# Patient Record
Sex: MALE | Race: WHITE | Employment: OTHER | URBAN - METROPOLITAN AREA
[De-identification: names, ages, dates, MRNs, and addresses within clinical notes are randomized per-mention and may not be internally consistent; named-entity substitution may affect disease eponyms.]

---

## 2018-10-15 PROBLEM — H01.00A BLEPHARITIS OF UPPER AND LOWER EYELIDS OF BOTH EYES, UNSPECIFIED TYPE: Status: ACTIVE | Noted: 2018-10-15

## 2018-10-15 PROBLEM — H25.13 NUCLEAR SCLEROTIC CATARACT OF BOTH EYES: Status: ACTIVE | Noted: 2018-10-15

## 2018-10-15 PROBLEM — H43.812 POSTERIOR VITREOUS DETACHMENT OF LEFT EYE: Status: ACTIVE | Noted: 2018-10-15

## 2018-10-15 PROBLEM — H01.00B BLEPHARITIS OF UPPER AND LOWER EYELIDS OF BOTH EYES, UNSPECIFIED TYPE: Status: ACTIVE | Noted: 2018-10-15

## 2018-10-15 PROBLEM — H52.7 REFRACTIVE ERROR: Status: ACTIVE | Noted: 2018-10-15

## 2019-10-17 PROBLEM — D49.2 GROWTH OF EYELID: Status: ACTIVE | Noted: 2019-10-17

## 2020-02-24 PROCEDURE — 88305 TISSUE EXAM BY PATHOLOGIST: CPT | Performed by: OPHTHALMOLOGY

## 2020-11-21 ENCOUNTER — HOSPITAL ENCOUNTER (OUTPATIENT)
Age: 75
Discharge: HOME | End: 2020-11-21
Attending: EMERGENCY MEDICINE
Payer: MEDICARE

## 2020-11-21 VITALS
OXYGEN SATURATION: 98 % | RESPIRATION RATE: 16 BRPM | DIASTOLIC BLOOD PRESSURE: 84 MMHG | SYSTOLIC BLOOD PRESSURE: 141 MMHG | TEMPERATURE: 99 F | HEART RATE: 60 BPM

## 2020-11-21 DIAGNOSIS — Z20.822 ENCOUNTER FOR LABORATORY TESTING FOR COVID-19 VIRUS: Primary | ICD-10-CM

## 2020-11-21 PROCEDURE — 99202 OFFICE O/P NEW SF 15 MIN: CPT | Performed by: EMERGENCY MEDICINE

## 2020-11-21 NOTE — ED PROVIDER NOTES
Patient Seen in: [facility]    History   Patient presents with:  Testing    Stated Complaint: wife positive    HPI    Patient complains of covid exposure  asymptomatic.     Alleviating factors:   Exacerbating factors:     Past Medical History: no lesions  NECK: supple, no meningeal signs  LUNGS: no resp distress, cta bilateral  CARDIO: RRR without murmur  GI: abdomen is soft and non tender, no masses, nl bowel sounds   EXTREMITIES: from, 5/5 strength in all 4 ext, no edema  NEURO: alert and oien

## 2021-01-14 ENCOUNTER — LAB SERVICES (OUTPATIENT)
Dept: LAB | Age: 76
End: 2021-01-14

## 2021-01-14 ENCOUNTER — LAB REQUISITION (OUTPATIENT)
Dept: LAB | Age: 76
End: 2021-01-14

## 2021-01-14 DIAGNOSIS — N40.0 BENIGN PROSTATIC HYPERPLASIA WITHOUT LOWER URINARY TRACT SYMPTOMS: ICD-10-CM

## 2021-01-14 DIAGNOSIS — I10 ESSENTIAL (PRIMARY) HYPERTENSION: ICD-10-CM

## 2021-01-14 DIAGNOSIS — E78.00 PURE HYPERCHOLESTEROLEMIA, UNSPECIFIED: ICD-10-CM

## 2021-01-14 LAB
ALBUMIN SERPL-MCNC: 4 G/DL (ref 3.6–5.1)
ALBUMIN/GLOB SERPL: 1.1 {RATIO} (ref 1–2.4)
ALP SERPL-CCNC: 67 UNITS/L (ref 45–117)
ALT SERPL-CCNC: 36 UNITS/L
ANION GAP SERPL CALC-SCNC: 13 MMOL/L (ref 10–20)
APPEARANCE UR: CLEAR
AST SERPL-CCNC: 27 UNITS/L
BASOPHILS # BLD: 0.1 K/MCL (ref 0–0.3)
BASOPHILS NFR BLD: 1 %
BILIRUB SERPL-MCNC: 0.8 MG/DL (ref 0.2–1)
BILIRUB UR QL STRIP: NEGATIVE
BUN SERPL-MCNC: 20 MG/DL (ref 6–20)
BUN/CREAT SERPL: 24 (ref 7–25)
CALCIUM SERPL-MCNC: 9.6 MG/DL (ref 8.4–10.2)
CHLORIDE SERPL-SCNC: 105 MMOL/L (ref 98–107)
CHOLEST SERPL-MCNC: 130 MG/DL
CHOLEST/HDLC SERPL: 3.9 {RATIO}
CO2 SERPL-SCNC: 28 MMOL/L (ref 21–32)
COLOR UR: YELLOW
CREAT SERPL-MCNC: 0.85 MG/DL (ref 0.67–1.17)
DEPRECATED RDW RBC: 44.9 FL (ref 39–50)
EOSINOPHIL # BLD: 0.2 K/MCL (ref 0–0.5)
EOSINOPHIL NFR BLD: 2 %
ERYTHROCYTE [DISTWIDTH] IN BLOOD: 13.9 % (ref 11–15)
FASTING DURATION TIME PATIENT: 12 HOURS
FASTING DURATION TIME PATIENT: 12 HOURS
GFR SERPLBLD BASED ON 1.73 SQ M-ARVRAT: 86 ML/MIN/1.73M2
GLOBULIN SER-MCNC: 3.6 G/DL (ref 2–4)
GLUCOSE SERPL-MCNC: 95 MG/DL (ref 65–99)
GLUCOSE UR STRIP-MCNC: NEGATIVE MG/DL
HCT VFR BLD CALC: 48.5 % (ref 39–51)
HDLC SERPL-MCNC: 33 MG/DL
HGB BLD-MCNC: 15.6 G/DL (ref 13–17)
HGB UR QL STRIP: NEGATIVE
IMM GRANULOCYTES # BLD AUTO: 0 K/MCL (ref 0–0.2)
IMM GRANULOCYTES # BLD: 0 %
KETONES UR STRIP-MCNC: NEGATIVE MG/DL
LDLC SERPL CALC-MCNC: 61 MG/DL
LEUKOCYTE ESTERASE UR QL STRIP: NEGATIVE
LYMPHOCYTES # BLD: 3.2 K/MCL (ref 1–4)
LYMPHOCYTES NFR BLD: 31 %
MCH RBC QN AUTO: 28.3 PG (ref 26–34)
MCHC RBC AUTO-ENTMCNC: 32.2 G/DL (ref 32–36.5)
MCV RBC AUTO: 88 FL (ref 78–100)
MONOCYTES # BLD: 0.6 K/MCL (ref 0.3–0.9)
MONOCYTES NFR BLD: 6 %
NEUTROPHILS # BLD: 6.4 K/MCL (ref 1.8–7.7)
NEUTROPHILS NFR BLD: 60 %
NITRITE UR QL STRIP: NEGATIVE
NONHDLC SERPL-MCNC: 97 MG/DL
NRBC BLD MANUAL-RTO: 0 /100 WBC
PH UR STRIP: 7 [PH] (ref 5–7)
PLATELET # BLD AUTO: 206 K/MCL (ref 140–450)
POTASSIUM SERPL-SCNC: 4.6 MMOL/L (ref 3.4–5.1)
PROT SERPL-MCNC: 7.6 G/DL (ref 6.4–8.2)
PROT UR STRIP-MCNC: NEGATIVE MG/DL
PSA SERPL-MCNC: 2.49 NG/ML
RBC # BLD: 5.51 MIL/MCL (ref 4.5–5.9)
SODIUM SERPL-SCNC: 141 MMOL/L (ref 135–145)
SP GR UR STRIP: 1.01 (ref 1–1.03)
TRIGL SERPL-MCNC: 181 MG/DL
UROBILINOGEN UR STRIP-MCNC: 0.2 MG/DL
WBC # BLD: 10.5 K/MCL (ref 4.2–11)

## 2021-01-14 PROCEDURE — 80061 LIPID PANEL: CPT | Performed by: CLINICAL MEDICAL LABORATORY

## 2021-01-14 PROCEDURE — 80053 COMPREHEN METABOLIC PANEL: CPT | Performed by: CLINICAL MEDICAL LABORATORY

## 2021-01-14 PROCEDURE — 84153 ASSAY OF PSA TOTAL: CPT | Performed by: CLINICAL MEDICAL LABORATORY

## 2021-01-14 PROCEDURE — 81003 URINALYSIS AUTO W/O SCOPE: CPT | Performed by: CLINICAL MEDICAL LABORATORY

## 2021-01-14 PROCEDURE — 85025 COMPLETE CBC W/AUTO DIFF WBC: CPT | Performed by: CLINICAL MEDICAL LABORATORY

## 2022-01-20 ENCOUNTER — LAB SERVICES (OUTPATIENT)
Dept: LAB | Age: 77
End: 2022-01-20

## 2022-01-20 ENCOUNTER — LAB REQUISITION (OUTPATIENT)
Dept: LAB | Age: 77
End: 2022-01-20

## 2022-01-20 DIAGNOSIS — I10 ESSENTIAL (PRIMARY) HYPERTENSION: ICD-10-CM

## 2022-01-20 DIAGNOSIS — N40.0 BENIGN PROSTATIC HYPERPLASIA WITHOUT LOWER URINARY TRACT SYMPTOMS: ICD-10-CM

## 2022-01-20 DIAGNOSIS — E78.00 PURE HYPERCHOLESTEROLEMIA, UNSPECIFIED: ICD-10-CM

## 2022-01-20 LAB
ALBUMIN SERPL-MCNC: 4 G/DL (ref 3.6–5.1)
ALBUMIN/GLOB SERPL: 1 {RATIO} (ref 1–2.4)
ALP SERPL-CCNC: 75 UNITS/L (ref 45–117)
ALT SERPL-CCNC: 39 UNITS/L
ANION GAP SERPL CALC-SCNC: 12 MMOL/L (ref 10–20)
APPEARANCE UR: CLEAR
AST SERPL-CCNC: 28 UNITS/L
BILIRUB SERPL-MCNC: 1.2 MG/DL (ref 0.2–1)
BILIRUB UR QL STRIP: NEGATIVE
BUN SERPL-MCNC: 19 MG/DL (ref 6–20)
BUN/CREAT SERPL: 18 (ref 7–25)
CALCIUM SERPL-MCNC: 9.5 MG/DL (ref 8.4–10.2)
CHLORIDE SERPL-SCNC: 105 MMOL/L (ref 98–107)
CHOLEST SERPL-MCNC: 127 MG/DL
CHOLEST/HDLC SERPL: 3.5 {RATIO}
CO2 SERPL-SCNC: 26 MMOL/L (ref 21–32)
COLOR UR: NORMAL
CREAT SERPL-MCNC: 1.04 MG/DL (ref 0.67–1.17)
DEPRECATED RDW RBC: 42.2 FL (ref 39–50)
ERYTHROCYTE [DISTWIDTH] IN BLOOD: 13 % (ref 11–15)
FASTING DURATION TIME PATIENT: 12 HOURS (ref 0–999)
FASTING DURATION TIME PATIENT: 12 HOURS (ref 0–999)
GFR SERPLBLD BASED ON 1.73 SQ M-ARVRAT: 70 ML/MIN
GLOBULIN SER-MCNC: 3.9 G/DL (ref 2–4)
GLUCOSE SERPL-MCNC: 95 MG/DL (ref 70–99)
GLUCOSE UR STRIP-MCNC: NEGATIVE MG/DL
HCT VFR BLD CALC: 49.2 % (ref 39–51)
HDLC SERPL-MCNC: 36 MG/DL
HGB BLD-MCNC: 16.9 G/DL (ref 13–17)
HGB UR QL STRIP: NEGATIVE
KETONES UR STRIP-MCNC: NEGATIVE MG/DL
LDLC SERPL CALC-MCNC: 67 MG/DL
LEUKOCYTE ESTERASE UR QL STRIP: NEGATIVE
MCH RBC QN AUTO: 30.5 PG (ref 26–34)
MCHC RBC AUTO-ENTMCNC: 34.3 G/DL (ref 32–36.5)
MCV RBC AUTO: 88.8 FL (ref 78–100)
NITRITE UR QL STRIP: NEGATIVE
NONHDLC SERPL-MCNC: 91 MG/DL
NRBC BLD MANUAL-RTO: 0 /100 WBC
PH UR STRIP: 6 [PH] (ref 5–7)
PLATELET # BLD AUTO: 161 K/MCL (ref 140–450)
POTASSIUM SERPL-SCNC: 4.5 MMOL/L (ref 3.4–5.1)
PROT SERPL-MCNC: 7.9 G/DL (ref 6.4–8.2)
PROT UR STRIP-MCNC: NEGATIVE MG/DL
PSA SERPL-MCNC: 2.77 NG/ML
RBC # BLD: 5.54 MIL/MCL (ref 4.5–5.9)
SODIUM SERPL-SCNC: 138 MMOL/L (ref 135–145)
SP GR UR STRIP: 1.01 (ref 1–1.03)
TRIGL SERPL-MCNC: 121 MG/DL
UROBILINOGEN UR STRIP-MCNC: 0.2 MG/DL
WBC # BLD: 9 K/MCL (ref 4.2–11)

## 2022-01-20 PROCEDURE — 80061 LIPID PANEL: CPT | Performed by: CLINICAL MEDICAL LABORATORY

## 2022-01-20 PROCEDURE — 84153 ASSAY OF PSA TOTAL: CPT | Performed by: CLINICAL MEDICAL LABORATORY

## 2022-01-20 PROCEDURE — 80053 COMPREHEN METABOLIC PANEL: CPT | Performed by: CLINICAL MEDICAL LABORATORY

## 2022-01-20 PROCEDURE — 36415 COLL VENOUS BLD VENIPUNCTURE: CPT | Performed by: CLINICAL MEDICAL LABORATORY

## 2022-01-20 PROCEDURE — 85027 COMPLETE CBC AUTOMATED: CPT | Performed by: CLINICAL MEDICAL LABORATORY

## 2022-01-20 PROCEDURE — 81003 URINALYSIS AUTO W/O SCOPE: CPT | Performed by: CLINICAL MEDICAL LABORATORY

## 2022-02-17 ENCOUNTER — HOSPITAL ENCOUNTER (OUTPATIENT)
Age: 77
Discharge: HOME | End: 2022-02-17
Payer: MEDICARE

## 2022-02-17 VITALS
BODY MASS INDEX: 28.98 KG/M2 | WEIGHT: 207 LBS | OXYGEN SATURATION: 99 % | TEMPERATURE: 100 F | SYSTOLIC BLOOD PRESSURE: 141 MMHG | DIASTOLIC BLOOD PRESSURE: 73 MMHG | HEART RATE: 71 BPM | RESPIRATION RATE: 16 BRPM | HEIGHT: 71 IN

## 2022-02-17 DIAGNOSIS — B02.9 HERPES ZOSTER WITHOUT COMPLICATION: Primary | ICD-10-CM

## 2022-02-17 PROCEDURE — 99203 OFFICE O/P NEW LOW 30 MIN: CPT | Performed by: NURSE PRACTITIONER

## 2022-02-17 RX ORDER — VALACYCLOVIR HYDROCHLORIDE 1 G/1
1000 TABLET, FILM COATED ORAL 3 TIMES DAILY
Qty: 21 TABLET | Refills: 0 | Status: SHIPPED | OUTPATIENT
Start: 2022-02-17 | End: 2022-02-24

## 2022-02-17 NOTE — ED INITIAL ASSESSMENT (HPI)
Pt presents to the IC with c/o a red, painful rash to the right side of his chest and right middle back. Pain increases with palpation (ie when his shirt rubs against the area per pt).

## 2022-12-15 ENCOUNTER — LAB SERVICES (OUTPATIENT)
Dept: LAB | Age: 77
End: 2022-12-15

## 2022-12-15 ENCOUNTER — LAB REQUISITION (OUTPATIENT)
Dept: LAB | Age: 77
End: 2022-12-15

## 2022-12-15 DIAGNOSIS — R35.89 OTHER POLYURIA: ICD-10-CM

## 2022-12-15 DIAGNOSIS — N40.0 BENIGN PROSTATIC HYPERPLASIA WITHOUT LOWER URINARY TRACT SYMPTOMS: ICD-10-CM

## 2022-12-15 LAB
ALBUMIN SERPL-MCNC: 4.1 G/DL (ref 3.6–5.1)
ALBUMIN/GLOB SERPL: 1.1 {RATIO} (ref 1–2.4)
ALP SERPL-CCNC: 76 UNITS/L (ref 45–117)
ALT SERPL-CCNC: 32 UNITS/L
ANION GAP SERPL CALC-SCNC: 11 MMOL/L (ref 7–19)
APPEARANCE UR: CLEAR
AST SERPL-CCNC: 25 UNITS/L
BACTERIA #/AREA URNS HPF: ABNORMAL /HPF
BILIRUB SERPL-MCNC: 1.1 MG/DL (ref 0.2–1)
BILIRUB UR QL STRIP: NEGATIVE
BUN SERPL-MCNC: 20 MG/DL (ref 6–20)
BUN/CREAT SERPL: 20 (ref 7–25)
CALCIUM SERPL-MCNC: 9.5 MG/DL (ref 8.4–10.2)
CHLORIDE SERPL-SCNC: 107 MMOL/L (ref 97–110)
CO2 SERPL-SCNC: 27 MMOL/L (ref 21–32)
COLOR UR: ABNORMAL
CREAT SERPL-MCNC: 1.01 MG/DL (ref 0.67–1.17)
DEPRECATED RDW RBC: 42.1 FL (ref 39–50)
ERYTHROCYTE [DISTWIDTH] IN BLOOD: 13.3 % (ref 11–15)
FASTING DURATION TIME PATIENT: 0 HOURS (ref 0–999)
GFR SERPLBLD BASED ON 1.73 SQ M-ARVRAT: 77 ML/MIN
GLOBULIN SER-MCNC: 3.8 G/DL (ref 2–4)
GLUCOSE SERPL-MCNC: 104 MG/DL (ref 70–99)
GLUCOSE UR STRIP-MCNC: NEGATIVE MG/DL
HCT VFR BLD CALC: 46.6 % (ref 39–51)
HGB BLD-MCNC: 16.1 G/DL (ref 13–17)
HGB UR QL STRIP: NEGATIVE
HYALINE CASTS #/AREA URNS LPF: ABNORMAL /LPF
KETONES UR STRIP-MCNC: NEGATIVE MG/DL
LEUKOCYTE ESTERASE UR QL STRIP: NEGATIVE
MCH RBC QN AUTO: 29.8 PG (ref 26–34)
MCHC RBC AUTO-ENTMCNC: 34.5 G/DL (ref 32–36.5)
MCV RBC AUTO: 86.3 FL (ref 78–100)
MUCOUS THREADS URNS QL MICRO: PRESENT
NITRITE UR QL STRIP: NEGATIVE
NRBC BLD MANUAL-RTO: 0 /100 WBC
PH UR STRIP: 6 [PH] (ref 5–7)
PLATELET # BLD AUTO: 201 K/MCL (ref 140–450)
POTASSIUM SERPL-SCNC: 4.5 MMOL/L (ref 3.4–5.1)
PROT SERPL-MCNC: 7.9 G/DL (ref 6.4–8.2)
PROT UR STRIP-MCNC: ABNORMAL MG/DL
PSA SERPL-MCNC: 3.4 NG/ML
RBC # BLD: 5.4 MIL/MCL (ref 4.5–5.9)
RBC #/AREA URNS HPF: ABNORMAL /HPF
SODIUM SERPL-SCNC: 140 MMOL/L (ref 135–145)
SP GR UR STRIP: 1.02 (ref 1–1.03)
SQUAMOUS #/AREA URNS HPF: ABNORMAL /HPF
UROBILINOGEN UR STRIP-MCNC: 0.2 MG/DL
WBC # BLD: 10.5 K/MCL (ref 4.2–11)
WBC #/AREA URNS HPF: ABNORMAL /HPF

## 2022-12-15 PROCEDURE — 81001 URINALYSIS AUTO W/SCOPE: CPT | Performed by: CLINICAL MEDICAL LABORATORY

## 2022-12-15 PROCEDURE — 80053 COMPREHEN METABOLIC PANEL: CPT | Performed by: CLINICAL MEDICAL LABORATORY

## 2022-12-15 PROCEDURE — 36415 COLL VENOUS BLD VENIPUNCTURE: CPT | Performed by: CLINICAL MEDICAL LABORATORY

## 2022-12-15 PROCEDURE — 84153 ASSAY OF PSA TOTAL: CPT | Performed by: CLINICAL MEDICAL LABORATORY

## 2022-12-15 PROCEDURE — 85027 COMPLETE CBC AUTOMATED: CPT | Performed by: CLINICAL MEDICAL LABORATORY

## 2023-01-26 ENCOUNTER — LAB REQUISITION (OUTPATIENT)
Dept: LAB | Age: 78
End: 2023-01-26

## 2023-01-26 ENCOUNTER — LAB SERVICES (OUTPATIENT)
Dept: LAB | Age: 78
End: 2023-01-26

## 2023-01-26 DIAGNOSIS — K22.70 BARRETT'S ESOPHAGUS WITHOUT DYSPLASIA: ICD-10-CM

## 2023-01-26 DIAGNOSIS — E78.00 PURE HYPERCHOLESTEROLEMIA, UNSPECIFIED: ICD-10-CM

## 2023-01-26 DIAGNOSIS — I10 ESSENTIAL (PRIMARY) HYPERTENSION: ICD-10-CM

## 2023-01-26 DIAGNOSIS — N40.0 BENIGN PROSTATIC HYPERPLASIA WITHOUT LOWER URINARY TRACT SYMPTOMS: ICD-10-CM

## 2023-01-26 LAB
ALBUMIN SERPL-MCNC: 4.1 G/DL (ref 3.6–5.1)
ALBUMIN/GLOB SERPL: 1.1 {RATIO} (ref 1–2.4)
ALP SERPL-CCNC: 75 UNITS/L (ref 45–117)
ALT SERPL-CCNC: 34 UNITS/L
ANION GAP SERPL CALC-SCNC: 11 MMOL/L (ref 7–19)
APPEARANCE UR: CLEAR
AST SERPL-CCNC: 24 UNITS/L
BILIRUB SERPL-MCNC: 1.2 MG/DL (ref 0.2–1)
BILIRUB UR QL STRIP: NEGATIVE
BUN SERPL-MCNC: 20 MG/DL (ref 6–20)
BUN/CREAT SERPL: 19 (ref 7–25)
CALCIUM SERPL-MCNC: 9.7 MG/DL (ref 8.4–10.2)
CHLORIDE SERPL-SCNC: 106 MMOL/L (ref 97–110)
CHOLEST SERPL-MCNC: 127 MG/DL
CHOLEST/HDLC SERPL: 4 {RATIO}
CO2 SERPL-SCNC: 26 MMOL/L (ref 21–32)
COLOR UR: NORMAL
CREAT SERPL-MCNC: 1.05 MG/DL (ref 0.67–1.17)
DEPRECATED RDW RBC: 42.6 FL (ref 39–50)
ERYTHROCYTE [DISTWIDTH] IN BLOOD: 13.1 % (ref 11–15)
FASTING DURATION TIME PATIENT: 12 HOURS (ref 0–999)
FASTING DURATION TIME PATIENT: 12 HOURS (ref 0–999)
GFR SERPLBLD BASED ON 1.73 SQ M-ARVRAT: 74 ML/MIN
GLOBULIN SER-MCNC: 3.7 G/DL (ref 2–4)
GLUCOSE SERPL-MCNC: 97 MG/DL (ref 70–99)
GLUCOSE UR STRIP-MCNC: NEGATIVE MG/DL
HCT VFR BLD CALC: 49.1 % (ref 39–51)
HDLC SERPL-MCNC: 32 MG/DL
HGB BLD-MCNC: 16.2 G/DL (ref 13–17)
HGB UR QL STRIP: NEGATIVE
KETONES UR STRIP-MCNC: NEGATIVE MG/DL
LDLC SERPL CALC-MCNC: 63 MG/DL
LEUKOCYTE ESTERASE UR QL STRIP: NEGATIVE
MCH RBC QN AUTO: 29.4 PG (ref 26–34)
MCHC RBC AUTO-ENTMCNC: 33 G/DL (ref 32–36.5)
MCV RBC AUTO: 89.1 FL (ref 78–100)
NITRITE UR QL STRIP: NEGATIVE
NONHDLC SERPL-MCNC: 95 MG/DL
NRBC BLD MANUAL-RTO: 0 /100 WBC
PH UR STRIP: 6 [PH] (ref 5–7)
PLATELET # BLD AUTO: 149 K/MCL (ref 140–450)
POTASSIUM SERPL-SCNC: 4.4 MMOL/L (ref 3.4–5.1)
PROT SERPL-MCNC: 7.8 G/DL (ref 6.4–8.2)
PROT UR STRIP-MCNC: NEGATIVE MG/DL
PSA SERPL-MCNC: 2.98 NG/ML
RBC # BLD: 5.51 MIL/MCL (ref 4.5–5.9)
SODIUM SERPL-SCNC: 139 MMOL/L (ref 135–145)
SP GR UR STRIP: 1.01 (ref 1–1.03)
TRIGL SERPL-MCNC: 161 MG/DL
UROBILINOGEN UR STRIP-MCNC: 0.2 MG/DL
WBC # BLD: 10.1 K/MCL (ref 4.2–11)

## 2023-01-26 PROCEDURE — 85027 COMPLETE CBC AUTOMATED: CPT | Performed by: CLINICAL MEDICAL LABORATORY

## 2023-01-26 PROCEDURE — 36415 COLL VENOUS BLD VENIPUNCTURE: CPT | Performed by: CLINICAL MEDICAL LABORATORY

## 2023-01-26 PROCEDURE — 81003 URINALYSIS AUTO W/O SCOPE: CPT | Performed by: CLINICAL MEDICAL LABORATORY

## 2023-01-26 PROCEDURE — 80061 LIPID PANEL: CPT | Performed by: CLINICAL MEDICAL LABORATORY

## 2023-01-26 PROCEDURE — 84153 ASSAY OF PSA TOTAL: CPT | Performed by: CLINICAL MEDICAL LABORATORY

## 2023-01-26 PROCEDURE — 80053 COMPREHEN METABOLIC PANEL: CPT | Performed by: CLINICAL MEDICAL LABORATORY

## 2023-11-11 ENCOUNTER — HOSPITAL ENCOUNTER (OUTPATIENT)
Age: 78
Discharge: HOME | End: 2023-11-11
Payer: MEDICARE

## 2023-11-11 VITALS
HEART RATE: 66 BPM | DIASTOLIC BLOOD PRESSURE: 74 MMHG | SYSTOLIC BLOOD PRESSURE: 120 MMHG | RESPIRATION RATE: 18 BRPM | OXYGEN SATURATION: 98 % | TEMPERATURE: 98 F

## 2023-11-11 DIAGNOSIS — K52.9 GASTROENTERITIS: Primary | ICD-10-CM

## 2023-11-11 LAB
#MXD IC: 1.1 X10ˆ3/UL (ref 0.1–1)
BILIRUB UR QL STRIP: NEGATIVE
BUN BLD-MCNC: 26 MG/DL (ref 7–18)
CHLORIDE BLD-SCNC: 100 MMOL/L (ref 98–112)
CLARITY UR: CLEAR
CO2 BLD-SCNC: 22 MMOL/L (ref 21–32)
CREAT BLD-MCNC: 1.1 MG/DL
EGFRCR SERPLBLD CKD-EPI 2021: 69 ML/MIN/1.73M2 (ref 60–?)
GLUCOSE BLD-MCNC: 114 MG/DL (ref 70–99)
GLUCOSE UR STRIP-MCNC: NEGATIVE MG/DL
HCT VFR BLD AUTO: 45.8 %
HCT VFR BLD CALC: 51 %
HGB BLD-MCNC: 14.9 G/DL
HGB UR QL STRIP: NEGATIVE
ISTAT IONIZED CALCIUM FOR CHEM 8: 1.23 MMOL/L (ref 1.12–1.32)
KETONES UR STRIP-MCNC: NEGATIVE MG/DL
LEUKOCYTE ESTERASE UR QL STRIP: NEGATIVE
LYMPHOCYTES # BLD AUTO: 1.4 X10ˆ3/UL (ref 1–4)
LYMPHOCYTES NFR BLD AUTO: 14.8 %
MCH RBC QN AUTO: 28.9 PG (ref 26–34)
MCHC RBC AUTO-ENTMCNC: 32.5 G/DL (ref 31–37)
MCV RBC AUTO: 88.8 FL (ref 80–100)
MIXED CELL %: 11.3 %
NEUTROPHILS # BLD AUTO: 7.2 X10ˆ3/UL (ref 1.5–7.7)
NEUTROPHILS NFR BLD AUTO: 73.9 %
NITRITE UR QL STRIP: NEGATIVE
PH UR STRIP: 6 [PH]
PLATELET # BLD AUTO: 167 X10ˆ3/UL (ref 150–450)
POTASSIUM BLD-SCNC: 3.8 MMOL/L (ref 3.6–5.1)
PROT UR STRIP-MCNC: 100 MG/DL
RBC # BLD AUTO: 5.16 X10ˆ6/UL
SODIUM BLD-SCNC: 136 MMOL/L (ref 136–145)
SP GR UR STRIP: >=1.03
UROBILINOGEN UR STRIP-ACNC: <2 MG/DL
WBC # BLD AUTO: 9.7 X10ˆ3/UL (ref 4–11)

## 2023-11-11 RX ORDER — ONDANSETRON 4 MG/1
4 TABLET, ORALLY DISINTEGRATING ORAL ONCE
Status: COMPLETED | OUTPATIENT
Start: 2023-11-11 | End: 2023-11-11

## 2023-11-11 RX ORDER — ONDANSETRON 4 MG/1
4 TABLET, ORALLY DISINTEGRATING ORAL EVERY 8 HOURS PRN
Qty: 10 TABLET | Refills: 0 | Status: SHIPPED | OUTPATIENT
Start: 2023-11-11 | End: 2023-11-18

## 2023-11-11 NOTE — DISCHARGE INSTRUCTIONS
Zofran 1 tablet every 8 hours as needed for nausea  Push fluids  VIKAS diet for now then slowly advance.   (Bananas, rice, applesauce, toast, tea)  If you develop abdominal pain, vomiting that will not stop, or worsening symptoms, please go to the emergency room  Follow up with your doctor in 2 days if no improvement

## 2023-11-11 NOTE — ED INITIAL ASSESSMENT (HPI)
Pt c/o diarrhea and emesis that began yesterday. States has been taking pepto bismol with no relief. C/o diffuse abdominal discomfort. Denies urinary symptoms. Denies blood in stool. Denies fever, chills. Denies sore throat, cough.

## 2024-02-22 ENCOUNTER — LAB SERVICES (OUTPATIENT)
Dept: LAB | Age: 79
End: 2024-02-22

## 2024-02-22 ENCOUNTER — LAB REQUISITION (OUTPATIENT)
Dept: LAB | Age: 79
End: 2024-02-22

## 2024-02-22 DIAGNOSIS — I10 ESSENTIAL (PRIMARY) HYPERTENSION: ICD-10-CM

## 2024-02-22 DIAGNOSIS — K22.70 BARRETT'S ESOPHAGUS WITHOUT DYSPLASIA: ICD-10-CM

## 2024-02-22 DIAGNOSIS — N40.0 BENIGN PROSTATIC HYPERPLASIA WITHOUT LOWER URINARY TRACT SYMPTOMS: ICD-10-CM

## 2024-02-22 DIAGNOSIS — E78.00 PURE HYPERCHOLESTEROLEMIA, UNSPECIFIED: ICD-10-CM

## 2024-02-22 PROCEDURE — 85027 COMPLETE CBC AUTOMATED: CPT | Performed by: CLINICAL MEDICAL LABORATORY

## 2024-02-22 PROCEDURE — 80053 COMPREHEN METABOLIC PANEL: CPT | Performed by: CLINICAL MEDICAL LABORATORY

## 2024-02-22 PROCEDURE — 81003 URINALYSIS AUTO W/O SCOPE: CPT | Performed by: CLINICAL MEDICAL LABORATORY

## 2024-02-22 PROCEDURE — 84153 ASSAY OF PSA TOTAL: CPT | Performed by: CLINICAL MEDICAL LABORATORY

## 2024-02-22 PROCEDURE — 80061 LIPID PANEL: CPT | Performed by: CLINICAL MEDICAL LABORATORY

## 2024-02-23 LAB
ALBUMIN SERPL-MCNC: 4.3 G/DL (ref 3.6–5.1)
ALBUMIN/GLOB SERPL: 1.3 {RATIO} (ref 1–2.4)
ALP SERPL-CCNC: 69 UNITS/L (ref 45–117)
ALT SERPL-CCNC: 29 UNITS/L
ANION GAP SERPL CALC-SCNC: 13 MMOL/L (ref 7–19)
APPEARANCE UR: CLEAR
AST SERPL-CCNC: 24 UNITS/L
BILIRUB SERPL-MCNC: 1 MG/DL (ref 0.2–1)
BILIRUB UR QL STRIP: NEGATIVE
BUN SERPL-MCNC: 22 MG/DL (ref 6–20)
BUN/CREAT SERPL: 30 (ref 7–25)
CALCIUM SERPL-MCNC: 9.7 MG/DL (ref 8.4–10.2)
CHLORIDE SERPL-SCNC: 106 MMOL/L (ref 97–110)
CHOLEST SERPL-MCNC: 142 MG/DL
CHOLEST/HDLC SERPL: 3.8 {RATIO}
CO2 SERPL-SCNC: 24 MMOL/L (ref 21–32)
COLOR UR: NORMAL
CREAT SERPL-MCNC: 0.74 MG/DL (ref 0.67–1.17)
DEPRECATED RDW RBC: 44.4 FL (ref 39–50)
EGFRCR SERPLBLD CKD-EPI 2021: >90 ML/MIN/{1.73_M2}
ERYTHROCYTE [DISTWIDTH] IN BLOOD: 13.3 % (ref 11–15)
FASTING DURATION TIME PATIENT: 12 HOURS (ref 0–999)
GLOBULIN SER-MCNC: 3.4 G/DL (ref 2–4)
GLUCOSE SERPL-MCNC: 100 MG/DL (ref 70–99)
GLUCOSE UR STRIP-MCNC: NEGATIVE MG/DL
HCT VFR BLD CALC: 50.7 % (ref 39–51)
HDLC SERPL-MCNC: 37 MG/DL
HGB BLD-MCNC: 16.9 G/DL (ref 13–17)
HGB UR QL STRIP: NEGATIVE
KETONES UR STRIP-MCNC: NEGATIVE MG/DL
LDLC SERPL CALC-MCNC: 75 MG/DL
LEUKOCYTE ESTERASE UR QL STRIP: NEGATIVE
MCH RBC QN AUTO: 30.1 PG (ref 26–34)
MCHC RBC AUTO-ENTMCNC: 33.3 G/DL (ref 32–36.5)
MCV RBC AUTO: 90.2 FL (ref 78–100)
NITRITE UR QL STRIP: NEGATIVE
NONHDLC SERPL-MCNC: 105 MG/DL
NRBC BLD MANUAL-RTO: 0 /100 WBC
PH UR STRIP: 6 [PH] (ref 5–7)
PLATELET # BLD AUTO: 150 K/MCL (ref 140–450)
POTASSIUM SERPL-SCNC: 4.6 MMOL/L (ref 3.4–5.1)
PROT SERPL-MCNC: 7.7 G/DL (ref 6.4–8.2)
PROT UR STRIP-MCNC: NEGATIVE MG/DL
PSA SERPL-MCNC: 3.17 NG/ML
RBC # BLD: 5.62 MIL/MCL (ref 4.5–5.9)
SODIUM SERPL-SCNC: 138 MMOL/L (ref 135–145)
SP GR UR STRIP: 1.02 (ref 1–1.03)
TRIGL SERPL-MCNC: 149 MG/DL
UROBILINOGEN UR STRIP-MCNC: 0.2 MG/DL
WBC # BLD: 10.2 K/MCL (ref 4.2–11)

## 2024-04-24 ENCOUNTER — TELEPHONE (OUTPATIENT)
Dept: SURGERY | Facility: CLINIC | Age: 79
End: 2024-04-24

## 2024-04-24 ENCOUNTER — OFFICE VISIT (OUTPATIENT)
Dept: SURGERY | Facility: CLINIC | Age: 79
End: 2024-04-24

## 2024-04-24 VITALS
SYSTOLIC BLOOD PRESSURE: 153 MMHG | DIASTOLIC BLOOD PRESSURE: 100 MMHG | BODY MASS INDEX: 29 KG/M2 | HEART RATE: 71 BPM | WEIGHT: 207 LBS

## 2024-04-24 DIAGNOSIS — L08.9 INFECTED SEBACEOUS CYST: ICD-10-CM

## 2024-04-24 DIAGNOSIS — L02.212 ABSCESS OF BACK: Primary | ICD-10-CM

## 2024-04-24 DIAGNOSIS — L72.3 INFECTED SEBACEOUS CYST: ICD-10-CM

## 2024-04-24 PROCEDURE — 99203 OFFICE O/P NEW LOW 30 MIN: CPT | Performed by: SURGERY

## 2024-04-24 RX ORDER — CLINDAMYCIN HYDROCHLORIDE 300 MG/1
300 CAPSULE ORAL 3 TIMES DAILY
Qty: 21 CAPSULE | Refills: 0 | Status: SHIPPED | OUTPATIENT
Start: 2024-04-24 | End: 2024-05-01

## 2024-04-24 RX ORDER — CLINDAMYCIN HYDROCHLORIDE 300 MG/1
300 CAPSULE ORAL 3 TIMES DAILY
Qty: 21 CAPSULE | Refills: 1 | Status: SHIPPED | OUTPATIENT
Start: 2024-04-24 | End: 2024-05-15

## 2024-04-24 RX ORDER — HYDROCODONE BITARTRATE AND ACETAMINOPHEN 5; 325 MG/1; MG/1
1 TABLET ORAL EVERY 6 HOURS PRN
Qty: 10 TABLET | Refills: 0 | Status: SHIPPED | OUTPATIENT
Start: 2024-04-24

## 2024-04-24 NOTE — H&P
History and Physical      HPI       HPI  Emerson Duran is a 78 year old male who presents with parent infected epidermoid cyst of the upper back.  Treated with 10 days of antibiotics.  Still swollen and erythematous.  Plan drainage and excision    Past Medical History:    Acid reflux    Essential hypertension     Past Surgical History:   Procedure Laterality Date    Appendectomy       Current Outpatient Medications   Medication Sig Dispense Refill    erythromycin 5 MG/GM Ophthalmic Ointment Place 1 Application into the left eye 3 (three) times daily. 1 Tube 0    tamsulosin HCl 0.4 MG Oral Cap       atorvastatin 40 MG Oral Tab       Amlodipine Besy-Benazepril HCl 5-10 MG Oral Cap       omega-3 fatty acids 1000 MG Oral Cap Take 1,000 mg by mouth daily.      Multiple Vitamins-Minerals (MULTIVITAMIN OR) Take by mouth.      aspirin 81 MG Oral Tab Take 81 mg by mouth daily.       ALLERGIES  No Known Allergies    Social History     Socioeconomic History    Marital status:    Tobacco Use    Smoking status: Former     Current packs/day: 0.00     Types: Cigarettes     Quit date:      Years since quittin.3    Smokeless tobacco: Never     Family History   Problem Relation Age of Onset    No Known Problems Father     No Known Problems Mother        Review of Systems   A comprehensive 10 point review of systems was completed.  Pertinent positives and negatives noted in the the HPI.    PHYSICAL EXAM   There were no vitals taken for this visit. No LMP for male patient.   Constitutional: appears well hydrated alert and responsive no acute distress noted  Head/Face: normocephalic  Nose/Mouth/Throat: nose and throat are clear palate is intact mucous membranes are moist no oral lesions are noted  Neck/Thyroid: neck is supple without adenopathy  Respiratory: normal to inspection lungs are clear to auscultation bilaterally normal respiratory effort    5 cm area of erythema and induration upper mid back consistent with  infected epidermoid cyst.  Cardiovascular: regular rate and rhythm no murmurs, gallups, or rubs  Abdomen: soft non-tender non-distended no organomegaly noted no masses  Extremities: no edema, cyanosis, or clubbing  Neurological: exam appropriate for age reflexes and motor skills appropriate for age      ASSESSMENT/PLAN   Assessment   Encounter Diagnoses   Name Primary?    Abscess of back Yes    Infected sebaceous cyst        78 year old male with back abscess and infected epidermoid cyst  We have discussed the surgical risks, benefits, alternatives, and expected recovery. We will plan incision and drainage and excision of infected cyst please see procedure note  Antibiotic therapy for 1 week.  Daily packing changes. All of the patient's questions have been answered to his satisfaction.       4/24/2024  Chadd Trent MD

## 2024-04-24 NOTE — TELEPHONE ENCOUNTER
Pharmacy calling for clarification of medication. Please advise.             clindamycin 300 MG Oral Cap 21 capsule 1 4/24/2024 5/15/2024    Sig - Route: Take 1 capsule (300 mg total) by mouth 3 (three) times daily for 21 days. - Oral    Sent to pharmacy as: Clindamycin HCl 300 MG Oral Capsule (Cleocin)    E-Prescribing Status: Receipt confirmed by pharmacy (4/24/2024  2:04 PM CDT)      Print Trail   Printed On Printed By Printed To Report   4/24/24 2:04 PM Chadd Trent MD [facility] Generic Report

## 2024-04-24 NOTE — PATIENT INSTRUCTIONS
Ice pack if needed.  Pull packing and repack once a day.  Cover with clean gauze and tape.    Follow-up 1 week.   your prescription and pain medication from the pharmacy and start today.

## 2024-04-29 ENCOUNTER — TELEPHONE (OUTPATIENT)
Dept: SURGERY | Facility: CLINIC | Age: 79
End: 2024-04-29

## 2024-04-29 NOTE — TELEPHONE ENCOUNTER
Pt called stating pt had an appointment on 4-24-24.  Pt is having a stomach ache and diarrhea from the antibiotic.  Call transferred to the nurse.

## 2024-04-29 NOTE — TELEPHONE ENCOUNTER
Spoke with patient, he states he has been having abdominal pain and diarrhea after taking the Clindamycin.  States he has three days left on it.  Per Dr. Trent, patient may stop the Clindamycin today.  Advised patient.  Verbal understanding received.

## 2024-05-01 ENCOUNTER — OFFICE VISIT (OUTPATIENT)
Dept: SURGERY | Facility: CLINIC | Age: 79
End: 2024-05-01

## 2024-05-01 DIAGNOSIS — T14.8XXA OPEN WOUND: Primary | ICD-10-CM

## 2024-05-01 PROCEDURE — 99213 OFFICE O/P EST LOW 20 MIN: CPT | Performed by: SURGERY

## 2024-05-01 NOTE — PROGRESS NOTES
General surgery note    Patient doing well.  Stopped his clindamycin due to diarrhea which is now stopped.  Had allergic reaction to the tape.  Wound overall much improved after the sutures removed and repacked which she was not doing.      Continue current regimen.  Follow-up 1 week

## 2024-05-01 NOTE — PATIENT INSTRUCTIONS
Pull inner packing and repack once a day.  Cover with clean gauze and paper tape.  To shower.    Follow-up 1 week

## 2024-05-08 ENCOUNTER — OFFICE VISIT (OUTPATIENT)
Dept: SURGERY | Facility: CLINIC | Age: 79
End: 2024-05-08

## 2024-05-08 DIAGNOSIS — T14.8XXA OPEN WOUND: Primary | ICD-10-CM

## 2024-05-08 NOTE — PROGRESS NOTES
General surgery note    Patient here for wound inspection.  He had a large infected cyst with abscess.  Was partially closed.  Packing was changed last week.  He presents now for wound check.    Physical exam:    Wound is healing well.  5 mm granuloma cauterized.  Remaining sutures removed.    follow-up for problems

## 2024-08-21 ENCOUNTER — EKG ENCOUNTER (OUTPATIENT)
Dept: LAB | Age: 79
End: 2024-08-21
Attending: FAMILY MEDICINE
Payer: MEDICARE

## 2024-08-21 ENCOUNTER — LAB ENCOUNTER (OUTPATIENT)
Dept: LAB | Age: 79
End: 2024-08-21
Attending: FAMILY MEDICINE
Payer: MEDICARE

## 2024-08-21 DIAGNOSIS — Z01.818 PREOP EXAMINATION: ICD-10-CM

## 2024-08-21 DIAGNOSIS — I10 ESSENTIAL HYPERTENSION, MALIGNANT: ICD-10-CM

## 2024-08-21 DIAGNOSIS — Z01.818 PREOP EXAMINATION: Primary | ICD-10-CM

## 2024-08-21 DIAGNOSIS — C44.111: ICD-10-CM

## 2024-08-21 LAB
ALBUMIN SERPL-MCNC: 4.6 G/DL (ref 3.2–4.8)
ALBUMIN/GLOB SERPL: 1.3 {RATIO} (ref 1–2)
ALP LIVER SERPL-CCNC: 73 U/L
ALT SERPL-CCNC: 28 U/L
ANION GAP SERPL CALC-SCNC: 6 MMOL/L (ref 0–18)
AST SERPL-CCNC: 29 U/L (ref ?–34)
ATRIAL RATE: 53 BPM
BILIRUB SERPL-MCNC: 1.3 MG/DL (ref 0.2–1.1)
BUN BLD-MCNC: 15 MG/DL (ref 9–23)
BUN/CREAT SERPL: 12.9 (ref 10–20)
CALCIUM BLD-MCNC: 10 MG/DL (ref 8.7–10.4)
CHLORIDE SERPL-SCNC: 107 MMOL/L (ref 98–112)
CO2 SERPL-SCNC: 28 MMOL/L (ref 21–32)
CREAT BLD-MCNC: 1.16 MG/DL
EGFRCR SERPLBLD CKD-EPI 2021: 64 ML/MIN/1.73M2 (ref 60–?)
FASTING STATUS PATIENT QL REPORTED: YES
GLOBULIN PLAS-MCNC: 3.5 G/DL (ref 2–3.5)
GLUCOSE BLD-MCNC: 108 MG/DL (ref 70–99)
OSMOLALITY SERPL CALC.SUM OF ELEC: 293 MOSM/KG (ref 275–295)
P AXIS: 36 DEGREES
P-R INTERVAL: 164 MS
POTASSIUM SERPL-SCNC: 4.4 MMOL/L (ref 3.5–5.1)
PROT SERPL-MCNC: 8.1 G/DL (ref 5.7–8.2)
Q-T INTERVAL: 428 MS
QRS DURATION: 92 MS
QTC CALCULATION (BEZET): 401 MS
R AXIS: -35 DEGREES
SODIUM SERPL-SCNC: 141 MMOL/L (ref 136–145)
T AXIS: -8 DEGREES
VENTRICULAR RATE: 53 BPM

## 2024-08-21 PROCEDURE — 80053 COMPREHEN METABOLIC PANEL: CPT

## 2024-08-21 PROCEDURE — 85027 COMPLETE CBC AUTOMATED: CPT

## 2024-08-21 PROCEDURE — 36415 COLL VENOUS BLD VENIPUNCTURE: CPT

## 2024-08-21 PROCEDURE — 93010 ELECTROCARDIOGRAM REPORT: CPT | Performed by: INTERNAL MEDICINE

## 2024-08-21 PROCEDURE — 93005 ELECTROCARDIOGRAM TRACING: CPT

## 2024-08-22 LAB
DEPRECATED RDW RBC AUTO: 42.8 FL (ref 35.1–46.3)
ERYTHROCYTE [DISTWIDTH] IN BLOOD BY AUTOMATED COUNT: 13.2 % (ref 11–15)
HCT VFR BLD AUTO: 48.2 %
HGB BLD-MCNC: 16.2 G/DL
MCH RBC QN AUTO: 29.7 PG (ref 26–34)
MCHC RBC AUTO-ENTMCNC: 33.6 G/DL (ref 31–37)
MCV RBC AUTO: 88.3 FL
PLATELETS.RETICULATED NFR BLD AUTO: 26.3 % (ref 0–7)
RBC # BLD AUTO: 5.46 X10(6)UL
WBC # BLD AUTO: 10.3 X10(3) UL (ref 4–11)

## 2024-09-03 ENCOUNTER — LAB ENCOUNTER (OUTPATIENT)
Dept: LAB | Age: 79
End: 2024-09-03
Attending: FAMILY MEDICINE
Payer: MEDICARE

## 2024-09-03 DIAGNOSIS — R79.89 ABNORMAL CBC: Primary | ICD-10-CM

## 2024-09-03 LAB
BASOPHILS # BLD AUTO: 0.06 X10(3) UL (ref 0–0.2)
BASOPHILS NFR BLD AUTO: 0.6 %
DEPRECATED RDW RBC AUTO: 42.7 FL (ref 35.1–46.3)
EOSINOPHIL # BLD AUTO: 0.09 X10(3) UL (ref 0–0.7)
EOSINOPHIL NFR BLD AUTO: 0.9 %
ERYTHROCYTE [DISTWIDTH] IN BLOOD BY AUTOMATED COUNT: 13.1 % (ref 11–15)
HCT VFR BLD AUTO: 48.9 %
HGB BLD-MCNC: 16.4 G/DL
IMM GRANULOCYTES # BLD AUTO: 0.04 X10(3) UL (ref 0–1)
IMM GRANULOCYTES NFR BLD: 0.4 %
LYMPHOCYTES # BLD AUTO: 3.39 X10(3) UL (ref 1–4)
LYMPHOCYTES NFR BLD AUTO: 33.7 %
MCH RBC QN AUTO: 30 PG (ref 26–34)
MCHC RBC AUTO-ENTMCNC: 33.5 G/DL (ref 31–37)
MCV RBC AUTO: 89.6 FL
MONOCYTES # BLD AUTO: 0.65 X10(3) UL (ref 0.1–1)
MONOCYTES NFR BLD AUTO: 6.5 %
NEUTROPHILS # BLD AUTO: 5.82 X10 (3) UL (ref 1.5–7.7)
NEUTROPHILS # BLD AUTO: 5.82 X10(3) UL (ref 1.5–7.7)
NEUTROPHILS NFR BLD AUTO: 57.9 %
PLATELETS.RETICULATED NFR BLD AUTO: 28.5 % (ref 0–7)
RBC # BLD AUTO: 5.46 X10(6)UL
WBC # BLD AUTO: 10.1 X10(3) UL (ref 4–11)

## 2024-09-03 PROCEDURE — 85025 COMPLETE CBC W/AUTO DIFF WBC: CPT

## 2024-09-03 PROCEDURE — 36415 COLL VENOUS BLD VENIPUNCTURE: CPT

## 2024-09-09 ENCOUNTER — HOSPITAL ENCOUNTER (OUTPATIENT)
Dept: CV DIAGNOSTICS | Facility: HOSPITAL | Age: 79
Discharge: HOME | End: 2024-09-09
Attending: FAMILY MEDICINE
Payer: MEDICARE

## 2024-09-09 ENCOUNTER — HOSPITAL ENCOUNTER (OUTPATIENT)
Dept: NUCLEAR MEDICINE | Facility: HOSPITAL | Age: 79
Discharge: HOME | End: 2024-09-09
Attending: FAMILY MEDICINE
Payer: MEDICARE

## 2024-09-09 DIAGNOSIS — R94.31 ABNORMAL EKG: ICD-10-CM

## 2024-09-09 LAB
% OF MAX PREDICTED HR: 100 %
MAX DIASTOLIC BP: 86 MMHG
MAX HEART RATE: 105 BPM
MAX PREDICTED HEART RATE: 142 BPM
MAX SYSTOLIC BP: 158 MMHG
MAX WORK LOAD: 10

## 2024-09-09 PROCEDURE — 93016 CV STRESS TEST SUPVJ ONLY: CPT | Performed by: INTERNAL MEDICINE

## 2024-09-09 PROCEDURE — 93018 CV STRESS TEST I&R ONLY: CPT | Performed by: INTERNAL MEDICINE

## 2024-09-09 PROCEDURE — 93017 CV STRESS TEST TRACING ONLY: CPT | Performed by: FAMILY MEDICINE

## 2024-09-09 PROCEDURE — 78452 HT MUSCLE IMAGE SPECT MULT: CPT | Performed by: FAMILY MEDICINE

## 2024-09-09 RX ORDER — REGADENOSON 0.08 MG/ML
INJECTION, SOLUTION INTRAVENOUS
Status: COMPLETED
Start: 2024-09-09 | End: 2024-09-09

## 2024-09-09 RX ADMIN — REGADENOSON 0.4 MG: 0.08 INJECTION, SOLUTION INTRAVENOUS at 09:01:00

## 2025-02-13 ENCOUNTER — LAB REQUISITION (OUTPATIENT)
Dept: LAB | Age: 80
End: 2025-02-13

## 2025-02-13 ENCOUNTER — LAB SERVICES (OUTPATIENT)
Dept: LAB | Age: 80
End: 2025-02-13

## 2025-02-13 DIAGNOSIS — I10 ESSENTIAL (PRIMARY) HYPERTENSION: ICD-10-CM

## 2025-02-13 DIAGNOSIS — E78.00 PURE HYPERCHOLESTEROLEMIA, UNSPECIFIED: ICD-10-CM

## 2025-02-13 DIAGNOSIS — R79.89 OTHER SPECIFIED ABNORMAL FINDINGS OF BLOOD CHEMISTRY: ICD-10-CM

## 2025-02-13 DIAGNOSIS — N40.0 BENIGN PROSTATIC HYPERPLASIA WITHOUT LOWER URINARY TRACT SYMPTOMS: ICD-10-CM

## 2025-02-13 LAB
ALBUMIN SERPL-MCNC: 4.4 G/DL (ref 3.4–5)
ALBUMIN/GLOB SERPL: 1.2 {RATIO} (ref 1–2.4)
ALP SERPL-CCNC: 77 UNITS/L (ref 45–117)
ALT SERPL-CCNC: 33 UNITS/L
ANION GAP SERPL CALC-SCNC: 7 MMOL/L (ref 7–19)
APPEARANCE UR: CLEAR
AST SERPL-CCNC: 26 UNITS/L
BILIRUB SERPL-MCNC: 1.3 MG/DL (ref 0.2–1)
BILIRUB UR QL STRIP: NEGATIVE
BUN SERPL-MCNC: 21 MG/DL (ref 6–20)
BUN/CREAT SERPL: 21 (ref 7–25)
CALCIUM SERPL-MCNC: 10.1 MG/DL (ref 8.4–10.2)
CHLORIDE SERPL-SCNC: 106 MMOL/L (ref 97–110)
CHOLEST SERPL-MCNC: 128 MG/DL
CHOLEST/HDLC SERPL: 3.3 {RATIO}
CO2 SERPL-SCNC: 28 MMOL/L (ref 21–32)
COLOR UR: NORMAL
CREAT SERPL-MCNC: 0.99 MG/DL (ref 0.67–1.17)
EGFRCR SERPLBLD CKD-EPI 2021: 78 ML/MIN/{1.73_M2}
FASTING DURATION TIME PATIENT: 12 HOURS (ref 0–999)
GLOBULIN SER-MCNC: 3.6 G/DL (ref 2–4)
GLUCOSE SERPL-MCNC: 107 MG/DL (ref 70–99)
GLUCOSE UR STRIP-MCNC: NEGATIVE MG/DL
HDLC SERPL-MCNC: 39 MG/DL
HGB UR QL STRIP: NEGATIVE
KETONES UR STRIP-MCNC: NEGATIVE MG/DL
LDLC SERPL CALC-MCNC: 62 MG/DL
LEUKOCYTE ESTERASE UR QL STRIP: NEGATIVE
NITRITE UR QL STRIP: NEGATIVE
NONHDLC SERPL-MCNC: 89 MG/DL
PH UR STRIP: 6.5 [PH] (ref 5–7)
POTASSIUM SERPL-SCNC: 4.3 MMOL/L (ref 3.4–5.1)
PROT SERPL-MCNC: 8 G/DL (ref 6.4–8.2)
PROT UR STRIP-MCNC: NEGATIVE MG/DL
PSA SERPL-MCNC: 3.46 NG/ML
SODIUM SERPL-SCNC: 137 MMOL/L (ref 135–145)
SP GR UR STRIP: 1.02 (ref 1–1.03)
TRIGL SERPL-MCNC: 134 MG/DL
UROBILINOGEN UR STRIP-MCNC: 0.2 MG/DL

## 2025-02-13 PROCEDURE — 81003 URINALYSIS AUTO W/O SCOPE: CPT | Performed by: CLINICAL MEDICAL LABORATORY

## 2025-02-13 PROCEDURE — 80053 COMPREHEN METABOLIC PANEL: CPT | Performed by: CLINICAL MEDICAL LABORATORY

## 2025-02-13 PROCEDURE — 80061 LIPID PANEL: CPT | Performed by: CLINICAL MEDICAL LABORATORY

## 2025-02-13 PROCEDURE — 84153 ASSAY OF PSA TOTAL: CPT | Performed by: CLINICAL MEDICAL LABORATORY

## 2025-02-13 PROCEDURE — 85025 COMPLETE CBC W/AUTO DIFF WBC: CPT | Performed by: CLINICAL MEDICAL LABORATORY

## 2025-02-14 LAB
BASOPHILS # BLD: 0.1 K/MCL (ref 0–0.3)
BASOPHILS NFR BLD: 1 %
DEPRECATED RDW RBC: 44.2 FL (ref 39–50)
EOSINOPHIL # BLD: 0.1 K/MCL (ref 0–0.5)
EOSINOPHIL NFR BLD: 1 %
ERYTHROCYTE [DISTWIDTH] IN BLOOD: 13.4 % (ref 11–15)
HCT VFR BLD CALC: 50.3 % (ref 39–51)
HGB BLD-MCNC: 16.8 G/DL (ref 13–17)
IMM GRANULOCYTES # BLD AUTO: 0.2 K/MCL (ref 0–0.2)
IMM GRANULOCYTES # BLD: 2 %
LYMPHOCYTES # BLD: 3 K/MCL (ref 1–4)
LYMPHOCYTES NFR BLD: 31 %
MCH RBC QN AUTO: 29.9 PG (ref 26–34)
MCHC RBC AUTO-ENTMCNC: 33.4 G/DL (ref 32–36.5)
MCV RBC AUTO: 89.7 FL (ref 78–100)
MONOCYTES # BLD: 0.6 K/MCL (ref 0.3–0.9)
MONOCYTES NFR BLD: 6 %
NEUTROPHILS # BLD: 5.8 K/MCL (ref 1.8–7.7)
NEUTROPHILS NFR BLD: 59 %
NRBC BLD MANUAL-RTO: 0 /100 WBC
PLATELET # BLD AUTO: NORMAL 10*3/UL
RBC # BLD: 5.61 MIL/MCL (ref 4.5–5.9)
WBC # BLD: 9.7 K/MCL (ref 4.2–11)

## 2025-03-05 ENCOUNTER — OFFICE VISIT (OUTPATIENT)
Age: 80
End: 2025-03-05
Attending: INTERNAL MEDICINE
Payer: MEDICARE

## 2025-03-05 VITALS
RESPIRATION RATE: 18 BRPM | WEIGHT: 201 LBS | HEART RATE: 76 BPM | BODY MASS INDEX: 29.1 KG/M2 | OXYGEN SATURATION: 95 % | TEMPERATURE: 98 F | SYSTOLIC BLOOD PRESSURE: 133 MMHG | DIASTOLIC BLOOD PRESSURE: 88 MMHG | HEIGHT: 69.5 IN

## 2025-03-05 DIAGNOSIS — R89.8 PSEUDOTHROMBOCYTOPENIA: Primary | ICD-10-CM

## 2025-03-05 DIAGNOSIS — R89.8 PSEUDOTHROMBOCYTOPENIA: ICD-10-CM

## 2025-03-05 LAB — LDH SERPL L TO P-CCNC: 185 U/L

## 2025-03-05 PROCEDURE — 85060 BLOOD SMEAR INTERPRETATION: CPT | Performed by: INTERNAL MEDICINE

## 2025-03-05 RX ORDER — PANTOPRAZOLE SODIUM 40 MG/1
40 TABLET, DELAYED RELEASE ORAL DAILY
COMMUNITY

## 2025-03-05 NOTE — CONSULTS
[facility] Hematology/Oncology Consultation Note    Patient Name: Emerson Duran   YOB: 1945   Medical Record Number: [id number]   CSN: [id number]   Consulting Physician: Cleveland Romero MD  Referring Physician(s): Dr. TEO LAI MD   Date of Consultation: 3/5/2025     Reason for Consultation:    1. Pseudothrombocytopenia      History of Present Illness:   Emerson Duran is a 79 year old male seen today in the Hematology Clinic  for platelet clumping. CBC done on 25 showed WBC 9.7 K, platelet count of unable to be assessed due clumping and Hemoglobin and Hematocrit of 16.8/50.3      I reviewed his prior labs dating back to  and he has had platelet clumping thought secondary to EDTA in the phlebotomy tube.  He denies any bleeding issues.  No recent changes to his medications.  He is in otherwise good health.    Denies any significant bleeding after prior surgery    Past Medical History:  Past Medical History:    Acid reflux    Essential hypertension       Past Surgical History:  Past Surgical History:   Procedure Laterality Date    Appendectomy         Family Medical History:  Family History   Problem Relation Age of Onset    No Known Problems Father     No Known Problems Mother        Gyne History:      Social History:  Social History     Socioeconomic History    Marital status:      Spouse name: Not on file    Number of children: Not on file    Years of education: Not on file    Highest education level: Not on file   Occupational History    Not on file   Tobacco Use    Smoking status: Former     Current packs/day: 0.00     Types: Cigarettes     Quit date:      Years since quittin.2    Smokeless tobacco: Never   Substance and Sexual Activity    Alcohol use: Not on file    Drug use: Not on file    Sexual activity: Not on file   Other Topics Concern    Not on file   Social History Narrative    Not on file     Social Drivers of Health     Food Insecurity: Not on file    Transportation Needs: Not on file   Housing Stability: At Risk (8/18/2023)    Received [facility]     Living Situation: Not on file     Housing Problems: Not on file       Allergies:   Allergies[1]    Current Medications:   pantoprazole 40 MG Oral Tab EC Take 1 tablet (40 mg total) by mouth daily.      HYDROcodone-acetaminophen 5-325 MG Oral Tab Take 1 tablet by mouth every 6 (six) hours as needed for Pain. 10 tablet 0    tamsulosin HCl 0.4 MG Oral Cap Take 1 capsule (0.4 mg total) by mouth daily.      atorvastatin 40 MG Oral Tab Take 1 tablet (40 mg total) by mouth nightly.      Amlodipine Besy-Benazepril HCl 5-10 MG Oral Cap Take 1 capsule by mouth daily.      omega-3 fatty acids 1000 MG Oral Cap Take 1,000 mg by mouth daily.      Multiple Vitamins-Minerals (MULTIVITAMIN OR) Take by mouth daily.      aspirin 81 MG Oral Tab Take 1 tablet (81 mg total) by mouth daily.         Review of Systems:  Pertinent positives and negatives noted in the the HPI.     Physical Examination:  /88 (BP Location: Right arm, Patient Position: Sitting, Cuff Size: adult)   Pulse 76   Temp 98.2 °F (36.8 °C) (Oral)   Resp 18   Ht 1.765 m (5' 9.5\")   Wt 91.2 kg (201 lb)   SpO2 95%   BMI 29.26 kg/m²     General: Patient is alert and oriented x 3, not in acute distress.  HEENT: EOMs intact. PERRL. Oropharynx is clear.   Neck: No JVD. No palpable lymphadenopathy. Neck is supple.  Lymphatics: There is no palpable lymphadenopathy throughout in the cervical, supraclavicular, or axillary regions.  Chest: Clear to auscultation. No wheezes or rales.  Heart: Regular rate and rhythm. S1S2 normal.  Abdomen: Soft, non tender.  No hepatosplenomegaly.  No palpable mass.  Extremities: No edema or calf tenderness.    Neurological: Grossly intact.     Labs:    Lab Results   Component Value Date/Time    WBC 10.1 09/03/2024 02:01 PM    RBC 5.46 09/03/2024 02:01 PM    HGB 16.4 09/03/2024 02:01 PM    HCT 48.9  09/03/2024 02:01 PM    MCV 89.6 09/03/2024 02:01 PM    MCH 30.0 09/03/2024 02:01 PM    MCHC 33.5 09/03/2024 02:01 PM    RDW 13.1 09/03/2024 02:01 PM    NEPRELIM 5.82 09/03/2024 02:01 PM    PLT  09/03/2024 02:01 PM      Comment:      An accurate platelet count cannot be determined due  to clumping.  Although platelets are estimated to be  normal, repeat platelet count from blood submitted  in sodium citrate is recommended as clinically indicated.          Impression:  Diagnosis  1. Pseudothrombocytopenia      79-year-old male referred here for evaluation of thrombocytopenia and platelet clumping.  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  This does not represent a pathologic condition but rather is artifactual.    Plan:  1.  I have recommended the patient undergo a CBC and a citrated tube today along with a peripheral blood smear.  2.  If his platelets are normal he does not require any further workup and would recommend any CBCs in the future be drawn in a citrated tube.    Thank you Dr TEO LAI MD for the opportunity to participate in the care of this interesting patient. Please do contact me if I may be of any further assistance    Cleveland Romero MD  [facility]     Copy to Dr. TEO LAI MD         [1] No Known Allergies

## 2025-03-06 ENCOUNTER — TELEPHONE (OUTPATIENT)
Age: 80
End: 2025-03-06

## 2025-03-06 LAB
BASOPHILS # BLD AUTO: 0.07 X10(3) UL (ref 0–0.2)
BASOPHILS NFR BLD AUTO: 0.6 %
DEPRECATED RDW RBC AUTO: 43.3 FL (ref 35.1–46.3)
EOSINOPHIL # BLD AUTO: 0.08 X10(3) UL (ref 0–0.7)
EOSINOPHIL NFR BLD AUTO: 0.7 %
ERYTHROCYTE [DISTWIDTH] IN BLOOD BY AUTOMATED COUNT: 13.2 % (ref 11–15)
HCT VFR BLD AUTO: 49.2 %
HGB BLD-MCNC: 16.7 G/DL
IMM GRANULOCYTES # BLD AUTO: 0.05 X10(3) UL (ref 0–1)
IMM GRANULOCYTES NFR BLD: 0.4 %
LYMPHOCYTES # BLD AUTO: 3.66 X10(3) UL (ref 1–4)
LYMPHOCYTES NFR BLD AUTO: 32.4 %
MCH RBC QN AUTO: 30.2 PG (ref 26–34)
MCHC RBC AUTO-ENTMCNC: 33.9 G/DL (ref 31–37)
MCV RBC AUTO: 89 FL
MONOCYTES # BLD AUTO: 0.57 X10(3) UL (ref 0.1–1)
MONOCYTES NFR BLD AUTO: 5 %
NEUTROPHILS # BLD AUTO: 6.86 X10 (3) UL (ref 1.5–7.7)
NEUTROPHILS # BLD AUTO: 6.86 X10(3) UL (ref 1.5–7.7)
NEUTROPHILS NFR BLD AUTO: 60.9 %
PLATELET # BLD AUTO: 204 10(3)UL (ref 150–450)
PLATELETS.RETICULATED NFR BLD AUTO: 21.8 % (ref 0–7)
RBC # BLD AUTO: 5.53 X10(6)UL
WBC # BLD AUTO: 11.3 X10(3) UL (ref 4–11)

## 2025-03-06 NOTE — TELEPHONE ENCOUNTER
Called Emerson, per Dr. Romero, your platlets are normal, at 204,000, you does not need any further hematology follow up. The clumping noted previously was from the EDTA anticoagulant in the tube. He voiced understanding and thanked me for the call

## 2025-05-25 ENCOUNTER — HOSPITAL ENCOUNTER (OUTPATIENT)
Age: 80
Discharge: HOME | End: 2025-05-25
Payer: MEDICARE

## 2025-05-25 VITALS
HEART RATE: 61 BPM | OXYGEN SATURATION: 100 % | TEMPERATURE: 98 F | RESPIRATION RATE: 17 BRPM | SYSTOLIC BLOOD PRESSURE: 151 MMHG | DIASTOLIC BLOOD PRESSURE: 82 MMHG

## 2025-05-25 DIAGNOSIS — T14.8XXA INFECTED WOUND: Primary | ICD-10-CM

## 2025-05-25 DIAGNOSIS — L08.9 INFECTED WOUND: Primary | ICD-10-CM

## 2025-05-25 RX ORDER — CEFADROXIL 500 MG/1
500 CAPSULE ORAL 2 TIMES DAILY
Qty: 20 CAPSULE | Refills: 0 | Status: SHIPPED | OUTPATIENT
Start: 2025-05-25 | End: 2025-06-04

## 2025-05-25 RX ORDER — CEFADROXIL 500 MG/1
500 CAPSULE ORAL 2 TIMES DAILY
Qty: 20 CAPSULE | Refills: 0 | Status: SHIPPED | OUTPATIENT
Start: 2025-05-25 | End: 2025-05-25

## 2025-05-25 NOTE — ED PROVIDER NOTES
He    Patient Seen in: [facility]      History     Chief Complaint   Patient presents with    Laceration/Abrasion     Stated Complaint: Laceration Infection  Subjective:   80-year-old male with a history of hypertension and GERD presents for a possible infected wound to the right mid shin.  He states a few days ago he was helping his wife outside and he hit his right shin on the wheelbarrow.  He has about a 4 inch superficial skin avulsion with surrounding erythema.  Mild discomfort.  No bony tenderness.  No active drainage or bleeding.  No numbness or tingling.  Ambulatory without difficulty.  He is not up-to-date with his tetanus vaccine.  No fevers or chills.  No history of diabetes.  He appears nontoxic.      Objective:   Past Medical History:    Acid reflux    Essential hypertension            Past Surgical History:   Procedure Laterality Date    Appendectomy                Social History     Socioeconomic History    Marital status:    Tobacco Use    Smoking status: Former     Current packs/day: 0.00     Types: Cigarettes     Quit date:      Years since quittin.4    Smokeless tobacco: Never     Social Drivers of Health      Received from [facility]    [facility]            Review of Systems    Positive for stated complaint: Laceration/Abrasion     Other systems are as noted in HPI.  Constitutional and vital signs reviewed.      All other systems reviewed and negative except as noted above.    Physical Exam     ED Triage Vitals [25 1236]   /82   Pulse 61   Resp 17   Temp 97.9 °F (36.6 °C)   Temp src Oral   SpO2 100 %   O2 Device None (Room air)     Current:/82   Pulse 61   Temp 97.9 °F (36.6 °C) (Oral)   Resp 17   SpO2 100%     Physical Exam  Vitals and nursing note reviewed.   Constitutional:       General: He is not in acute distress.     Appearance: Normal appearance. He is not toxic-appearing.   HENT:      Mouth/Throat:      Mouth: Mucous membranes are  moist.   Cardiovascular:      Rate and Rhythm: Normal rate and regular rhythm.   Pulmonary:      Effort: Pulmonary effort is normal.      Breath sounds: Normal breath sounds.   Musculoskeletal:         General: Normal range of motion.   Skin:     General: Skin is warm and dry.      Capillary Refill: Capillary refill takes less than 2 seconds.      Findings: Erythema present.      Comments: Approximate 4 inch superficial skin avulsion to the right anterior shin.  No active bleeding.  No pus drainage.  There is surrounding erythema.  No bony tenderness.  CMS intact.   Neurological:      General: No focal deficit present.      Mental Status: He is alert and oriented to person, place, and time.   Psychiatric:         Mood and Affect: Mood normal.         Behavior: Behavior normal.         ED Course   No results found.  Labs Reviewed - No data to display    MDM     Medical Decision Making  The patient has been washing and drying the wound twice daily and applying antibiotic ointment twice daily.  I will place him on Duricef due to the surrounding erythema.  We discussed continuing good wound care.  He declines a tetanus today.  He will follow-up with his primary care provider for wound check in the next few days.  He is aware for any fevers or other worsening or concerning symptoms, to go to the nearest emergency department for further evaluation.    Risk  OTC drugs.  Prescription drug management.  Risk Details: Healing wound versus infected wound        Disposition and Plan     Clinical Impression:  1. Infected wound         Disposition:  Discharge  5/25/2025 12:58 pm    Follow-up:  Ryder Vines MD  [address]  [address]  [facility] [address] [phone]    Schedule an appointment as soon as possible for a visit in 3 days  For wound re-check          Medications Prescribed:  Discharge Medication List as of 5/25/2025 12:58 PM        START taking these medications    Details   cefadroxil 500 MG Oral Cap Take 1  capsule (500 mg total) by mouth 2 (two) times daily for 10 days., Normal, Disp-20 capsule, R-0

## 2025-05-25 NOTE — ED INITIAL ASSESSMENT (HPI)
Pt with abrasion to R shin. Pt stated that he cut himself running into a metal wheelbarrow. Pt reports that he was wearing jeans and they did not rip. Pt denied pain and denied fevers.

## 2025-05-25 NOTE — DISCHARGE INSTRUCTIONS
Continue to wash twice daily with soap and water.  Dry thoroughly after.  Apply antibiotic ointment twice daily.  Take the antibiotics as prescribed.  Follow-up for wound check with your primary care provider.  Return for any concerns.

## 2025-07-31 ENCOUNTER — CLINICAL ABSTRACT (OUTPATIENT)
Age: 80
End: 2025-07-31

## 2025-07-31 VITALS — DIASTOLIC BLOOD PRESSURE: 88 MMHG | SYSTOLIC BLOOD PRESSURE: 133 MMHG

## (undated) NOTE — Clinical Note
Thank you Dr TEO LAI MD for the opportunity to participate in the care of this interesting patient. Please do contact me if I may be of any further assistance  Cleveland Romero MD [facility]